# Patient Record
Sex: MALE | Race: WHITE
[De-identification: names, ages, dates, MRNs, and addresses within clinical notes are randomized per-mention and may not be internally consistent; named-entity substitution may affect disease eponyms.]

---

## 2017-12-18 ENCOUNTER — HOSPITAL ENCOUNTER (OUTPATIENT)
Dept: HOSPITAL 80 - FIMAGING | Age: 82
End: 2017-12-18
Attending: INTERNAL MEDICINE
Payer: COMMERCIAL

## 2017-12-18 DIAGNOSIS — R91.1: Primary | ICD-10-CM

## 2017-12-18 DIAGNOSIS — J84.9: ICD-10-CM

## 2017-12-18 DIAGNOSIS — J43.2: ICD-10-CM

## 2019-02-28 ENCOUNTER — HOSPITAL ENCOUNTER (EMERGENCY)
Dept: HOSPITAL 80 - FED | Age: 84
Discharge: HOME | End: 2019-02-28
Payer: COMMERCIAL

## 2019-02-28 VITALS — SYSTOLIC BLOOD PRESSURE: 133 MMHG | DIASTOLIC BLOOD PRESSURE: 74 MMHG
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
## 2019-02-28 DIAGNOSIS — Z23: ICD-10-CM

## 2019-02-28 DIAGNOSIS — S81.812A: Primary | ICD-10-CM

## 2019-02-28 NOTE — EDPHY
H & P


Stated Complaint: left skin lac after tripping


Time Seen by Provider: 02/28/19 15:35


HPI/ROS: 





CHIEF COMPLAINT:  Left pretibial laceration post mechanical fall





HISTORY OF PRESENT ILLNESS:  83-year-old male arrives via ambulance, not a 

trauma activation, complaining of acute left pretibial laceration.  He 

describes approximately 3 hr prior to arrival he was walking on stairs, 

sustained a mechanical fall and impacted his left pretibial region against the 

stair  He returned home and noted continued slow bleeding into his sock and 

shoe.  He is able to bear weight.  





Denies:  Head injury, so midline C-spine pain, peripheral paresthesia, weakness

, numbness, syncope, back pain, inability to bear weight, palpitations, 

prodrome.





PRIMARY CARE PROVIDER:  Dr. Abel Arce





REVIEW OF SYSTEMS:


10 systems reviewed and negative with the exception of the elements mentioned 

in the history of present illness








PAST MEDICAL/SURGICAL HISTORY: [no anticoagulant use beyond daily aspirin.





SOCIAL HISTORY: denies alcohol use at time of incident





*********





PHYSICAL EXAM 





1) GENERAL: Well-developed, well-nourished, alert and oriented.  Appears to be 

in no acute distress. Answering questions appropriately.  Smiling.  

Conversational.  Telling jokes.


2) HEAD: Normocephalic, atraumatic


3) HEENT: Pupils equal, round, reactive to light bilaterally. Negative Horners. 

Nasopharynx, oropharynx, clear.   No deformity or angulation of nose.  No 

septal hematoma.  No rhinorrhea. No oral trauma. Ears bilaterally with normal 

tympanic membranes.  No hemotympanum.  No fluid or blood in the external 

auditory canal.  No raccoon eyes.  No Steve sign..


4) NECK:  No cervical collar is on. Posterior cervical spine is nontender, no 

stepoff, no effusion. Full range of motion which does not elicit any midline 

cervical spine pain, no posterior midline tenderness, no step-off.


5) LUNGS: Clear to auscultation bilaterally, no wheezes, no rhonchi, no 

retractions.  No obvious signs of trauma.  No chest wall pain.  No flaring, no 

grunting.  Moving symmetrically.  No crepitus. 


6) HEART:  Regular rate and rhythm, 


7) ABDOMEN: No guarding, no rebound, no focal tenderness, no peritoneal signs, 

no signs of trauma, no ecchymosis


8) MUSCULOSKELETAL: Left pretibial skin avulsion.  No underlying osseous 

discomfort.  Soft compartments.  DP PT pulses present and brisk distally.  

Proximally distally nontender.  Moving all extremities, no focal areas of 

tenderness, no obvious trauma.


9) BACK:  No midline vertebral tenderness, no fluctuance, no step-off, no 

obvious trauma, no visual or palpable abnormality.


10) SKIN:   left pretibial skin avulsion, longitudinal measuring 1 cm in width 

and 6 cm in length.





***********





DIFFERENTIAL DIAGNOSIS:   In no particular order include but limited to avulsion

, abrasion, laceration, open fracture





- Personal History


Current Tetanus Diphtheria and Acellular Pertussis (TDAP): No


Tetanus Vaccine Date: 2007





- Medical/Surgical History


Hx Asthma: No


Hx Chronic Respiratory Disease: Yes


Hx Diabetes: No


Hx Cardiac Disease: No


Hx Renal Disease: No


Hx Cirrhosis: No


Hx Alcoholism: No


Hx HIV/AIDS: No


Hx Splenectomy or Spleen Trauma: No


Other PMH: interstitial lung disease





- Social History


Smoking Status: Former smoker


Constitutional: 


 Initial Vital Signs











Temperature (C)  36.5 C   02/28/19 15:29


 


Heart Rate  73   02/28/19 15:29


 


Respiratory Rate  18   02/28/19 15:29


 


Blood Pressure  123/82 H  02/28/19 15:29


 


O2 Sat (%)  92   02/28/19 15:29








 











O2 Delivery Mode               Nasal Cannula


 


O2 (L/minute)                  2














Allergies/Adverse Reactions: 


 





Penicillins Allergy (Verified 02/28/19 15:34)


 











Medical Decision Making





- Diagnostics


Imaging Results: 


 Imaging Impressions





Tibia/Fibula X-Ray  02/28/19 15:34


Impression: 


1. Soft tissue injury with no acute osseous findings.


2. Atherosclerosis.








Images reviewed myself


Procedures: 





Patient's pretibial laceration was anesthetized 1% lidocaine with epinephrine 

and copiously irrigated by myself and staff.  The patient has superficial skin 

avulsion that will not be amenable to closure.  There was a small area on the 

more proximal aspect laceration which was bleeding slowly with resultant 

hemostasis after surgery foam application.  The wound be allowed to heal via 

secondary intention.  Shows no signs of infection to this area.


ED Course/Re-evaluation: 





Re-evaluation with serial exams.  Wound will be allowed to heal via secondary 

intention.  Skin is superficial and thin and would not be amenable to closure 

with sutures.  No signs of infection at this time.  Given usual customary wound 

precautions instructions.  Patient feels comfortable being discharged.  Care of 

patient under supervision of secondary supervising physician Dr Bearden .





- Data Points


Medications Given: 


 








Discontinued Medications





Diphtheria/Tetanus/Acell Pertussis (Boostrix)  0.5 ml IM .ONCE ONE


   Stop: 02/28/19 15:36


   Last Admin: 02/28/19 15:55 Dose:  0.5 ml


Tranexamic Acid (Cyklokapron)  500 mg TP EDNOW ONE


   Stop: 02/28/19 15:52


   Last Admin: 02/28/19 15:58 Dose:  500 mg








Departure





- Departure


Disposition: Home, Routine, Self-Care


Clinical Impression: 


 Left pretibial skin avulsion





Condition: Good


Instructions:  Skin Avulsion (ED)


Additional Instructions: 


Return to the ER if you develop redness, swelling, discharge, warmth to the 

wound, red streaks going up your leg, or any other symptoms that concern you.


Referrals: 


Abel Arce MD [Medical Doctor] - 2-3 days, call for appt.

## 2019-03-17 ENCOUNTER — HOSPITAL ENCOUNTER (EMERGENCY)
Dept: HOSPITAL 80 - FED | Age: 84
Discharge: HOME | End: 2019-03-17
Payer: COMMERCIAL

## 2019-03-17 VITALS — DIASTOLIC BLOOD PRESSURE: 84 MMHG | SYSTOLIC BLOOD PRESSURE: 128 MMHG
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
## 2019-03-17 DIAGNOSIS — Z99.81: ICD-10-CM
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
## 2019-03-17 DIAGNOSIS — L03.116: ICD-10-CM

## 2019-03-17 DIAGNOSIS — M79.672: Primary | ICD-10-CM

## 2019-03-17 DIAGNOSIS — J98.4: ICD-10-CM

## 2019-03-17 NOTE — ASMTCAGE
CAGE

 

Do you feel you ought to      Answers:  Yes                                   

cut down on your drinking                                                     

or drug use?                                                                  

Do people annoy you by        Answers:  No                                    

criticizing your drinking                                                     

or drug use?                                                                  

Do you feel guilty about      Answers:  Yes                                   

your drinking or drug                                                         

use?                                                                          

Do you drink or use drugs     Answers:  No                                    

first thing in the                                                            

morning (Eye Opener)?                                                         

Additional Comments           See CM notes.

 

Date Signed:  03/17/2019 12:25 PM

Electronically Signed By:Montserrat Peoples RN

## 2019-03-17 NOTE — ASMTCMCOM
CM Note

 

CM Note                       

Notes:

Reviewed chart. Pt presented to the Emergency Department with c/o left foot pain s/p a fall two 

days ago. Pt reports tripping on his oxygen tubing. History includes interstitial lung disease with 


chronic oxygen use, eczema, former smoker. Pt is a  and lives in [place]. He is accompanied by 


a friend from [place]. Pt's dghtr Berenice is his emergency contact.



Met with pt and pt's dghtr Berenice to discuss potential needs. Pt was seen on 02/28/19 for a leg 

laceration s/p a fall. Pt reports that this was his third fall in recent months. The pt states he 

was recently put on continuous oxygen support and since that time he has been having trouble 

managing the oxygen tubing, especially at night. Discussed possibility of using a walker to assist 

pt with stability. Pt agreeable. Instructions provided on utilizing the walker with oxygen 

tubing. Pt verbalized understanding. Information provided on DME/loan closets for obtaining a 

walker. Pt's dghtr reports having a walker in her garage. [employer] information provided 

and discussed for safety. 



CAGE completed secondary to a positive SBIRT screening. Pt states he "drinks way too much" and he 

knows he "should stop." The pt said he started drinking after his wife passed away - he drinks at 

night to help him sleep. Resources offered. Encouraged pt to follow up with his PCP to discuss 

possible medications for sleep and/or depression. Education offered on counseling and grief/loss 

support groups. Pt states he used to attend a support group, but stopped going. Encouraged pt to 

reconsider trying a support group again. [facility] offered for additional 

resources. CM available for any further issues or concerns.

 

 

Date Signed:  03/17/2019 12:42 PM

Electronically Signed By:Montserrat Peoples RN

## 2019-03-17 NOTE — EDPHY
General





- History


Smoking Status: Former smoker


Time Seen by Provider: 03/17/19 10:12


Narrative: 





CLINICAL IMPRESSION:  Left foot pain, cellulitis


 


ASSESSMENT/PLAN: 





Patient is a 83-year-old man with a history of interstitial lung disease who 

presents to the emergency department with complaints of left foot pain. Patient 

is afebrile and not toxic appearing, no acute distress. Physical exam reveals 

generalized pain to the lateral aspect of the left foot with associated erythema

, calor and scabbing wounds.  Foot and ankle x-ray revealed no evidence of 

acute bony abnormality.  There was no evidence of fracture, dislocation or 

compartment syndrome.  No findings to suggest gout or septic arthritis.  

History of physical examination is consistent with left foot pain and 

cellulitis.  There was no evidence of systemic infection, abscess, necrotizing 

skin infection or deep space infection.  Patient has had increased number of 

falls recently, case management was consulted and visited with the patient and 

his daughter.  Had a long discussion with the patient's daughter about 

independent living and possible increased needs.  Patient was given Keflex and 

Bactrim in the emergency department and will continue for the next week.  He is 

well established with his PCP and will call to schedule an appointment for 

repeat exam for tomorrow, areas of erythema were marked for reference.  They 

have a walker at home and will use as needed, prior to discharge he was able to 

ambulate independently with a mild limp.  Conservative return precautions 

discussed- he will return for fever, redness, swelling, warmth, or streaking 

around the wound, new lesions, extremity swelling, pain out of proportion or 

for any other new, worsening or worrisome symptoms. Patient and daughter both 

verbalize understanding and are in agreement with plan.





DIFFERENTIAL DX: 


Differential diagnosis including but not limited to contusion, sprain, fracture

, dislocation


 


ED COURSE: 





1118:  Case and plan of care discussed with Dr. Troy





1122:  On repeat examination the patient is well-appearing, reviewed x-ray 

findings with him.  Awaiting arrival of daughter for further discussion as well 

as case management.  Discussed admission however patient refuses.





1215:  Patient's daughter at bedside, lives very close to father.  They have a 

walker at home which we will we will utilize.  They declined any need for 

further case management assistance, he is well established with his primary 

care provider and they will be seen tomorrow for repeat examination.





CHIEF COMPLAINT:  Left foot pain





HPI: 





Patient is an 83-year-old male with a history of interstitial lung disease 

chronically on oxygen therapy who presents to the emergency department with 

complaints of left foot pain after sustaining a fall 2 days prior.  Patient 

reports he was walking around his house when he accidentally tripped on his 

oxygen tubing causing him to injure his left foot.  He is unsure if he rolled 

it or hit it on something.  Patient noticed some redness to the outer portion 

of his foot yesterday, pain has persisted and ambulation is difficult.  He is 

ambulating with a limp.  Brought in by a friend from [place] as a saw him 

struggling to walk.  Patient did not hit his head, there was no loss of 

consciousness.  He denies any neck or back pain.  He denies any other injury or 

complaint.  He does have a history of underlying eczema with chronic wounds on 

his feet.  


_________________ 


PAST MEDICAL HISTORY:  Interstitial lung disease


Family History:  Noncontributory 


Social History:  Former smoker


_________________ 


ROS: 


A full 10 point review of systems was negative except for those mentioned in 

HPI. 


_________________ 


PHYSICAL EXAM: 


General Appearance: Alert, oriented, appropriate, cooperative, NAD, well 

hydrated, non-toxic appearing, VSS, no hypoxia. 


HEENT: TMs are clear bilaterally no perforation or FB, no injection, no 

evidence of serous or mucopurulent otitis. Oropharynx clear is no erythema or 

exudates, no tonsillar hypertrophy or asymmetry. Dentition without abnormality. 


Eyes: PERRLA, no acute vision change, nystagmus, swelling, discharge, pain or 

photosensitivity. Conjunctiva pink, no pallor or injection 


Neck: Supple, nontender, no lymphadenopathy, no midline pain, FROM, no 

meningismus. 


Respiratory: There are no retractions, lungs are clear to auscultation. 


Cardiac: Regular rate and rhythm, no murmurs or gallops. 


Gastrointestinal: Abdomen is soft, nontender, bowel sounds normal, no masses/

hernia, no rigidity, guarding or focal peritoneal findings. 


Skin: Warm, dry, no rashes, no nodules on palpation. 





Upper Extremities: Intact distal pulses, Full range of motion intact, no 

tenderness, no ecchymosis or edema


Lower Extremities:  Right lower extremity is unremarkable.  Intact distal pulses

, No edema, No tenderness, No cyanosis, full range of motion intact.


Left lower extremity reveals healing scab of the anterior shin consistent with 

laceration sustained recently.  Patient with multiple scabs superior to the 

left lateral malleolus, multiple scabs on the lateral aspect of the left foot.  

Patient with generalized erythema and calor of the lateral midfoot with 

associated tenderness to palpation.  He has no medial or lateral malleolar 

tenderness to palpation.





No calf tenderness bilaterally.


_________________ 


MEDICAL DECISION MAKING: 


Patient was seen independently. Secondary supervising physician at time of 

evaluation was Dr. Troy, he did not evaluate this patient. 


Diagnosis:  Left foot pain, cellulitis. New, requires workup 


Summary: See Assessment and Plan for summary of ED visit  


Clinical lab tests:  Not applicable. 


Independent visualization of images, tracing, or specimens:  Yes. 


Decision to obtain medical records or history from someone other than the 

patient:  No 


Review / Summarize previous medical records:  Yes 


Discussed patient with another provider:  Yes, Dr. Troy 


Patient Progress:  Stable, discharged.  (Margaux Lainez)


Medical Decision Making: 





I did not see this patient while he was in the emergency department.  However 

his care was discussed with the PA while the patient was in the department.  I 

agree with treatment plan and management (Miah Troy)





- Diagnostics


Imaging Results: 


 Imaging Impressions





Ankle X-Ray  03/17/19 10:01


Impression: There is no acute osseous abnormality identified.








Foot X-Ray  03/17/19 10:14


Impression: There is no acute osseous abnormality identified.














- Objective


Vital Signs: 


 Initial Vital Signs











Temperature (C)  36.5 C   03/17/19 09:57


 


Heart Rate  81   03/17/19 09:57


 


Respiratory Rate  20   03/17/19 09:57


 


Blood Pressure  120/79   03/17/19 09:57


 


O2 Sat (%)  84 L  03/17/19 09:57








 











O2 Delivery Mode               Nasal Cannula


 


O2 (L/minute)                  3














Allergies/Adverse Reactions: 


 





Penicillins Allergy (Verified 03/17/19 09:56)


 








Home Medications: 














 Medication  Instructions  Recorded


 


Aspirin  03/17/19


 


Cephalexin [Keflex (*)] 500 mg PO Q6H #28 cap 03/17/19


 


Preservision Softgel  03/17/19


 


Sulfamethox/Tmp 800/160 mg 1 tab PO BID #14 tab 03/17/19





[Bactrim Ds]  











Medications Given: 


 








Discontinued Medications





Acetaminophen (Tylenol)  650 mg PO EDNOW ONE


   Stop: 03/17/19 11:50


   Last Admin: 03/17/19 12:08 Dose:  650 mg


Cephalexin HCl (Keflex)  500 mg PO EDNOW ONE


   PRN Reason: Protocol


   Stop: 03/17/19 11:17


   Last Admin: 03/17/19 11:41 Dose:  500 mg


Trimethoprim/Sulfamethoxazole (Bactrim Ds)  1 ea PO EDNOW ONE


   PRN Reason: Protocol


   Stop: 03/17/19 11:18


   Last Admin: 03/17/19 11:42 Dose:  1 ea








Departure





- Departure


Disposition: Home, Routine, Self-Care


Clinical Impression: 


 Foot pain, left





Cellulitis


Qualifiers:


 Site of cellulitis: extremity Site of cellulitis of extremity: lower extremity 

Laterality: left Qualified Code(s): L03.116 - Cellulitis of left lower limb





Condition: Good


Instructions:  Cellulitis (ED)


Additional Instructions: 


DISCHARGE INSTRUCTIONS FROM YOUR DOCTOR 


Thank you for visiting our emergency department today. Please keep in mind that 

discharge from the emergency department does not mean that there is nothing 

wrong - it simply means that we have not identified an emergency condition that 

requires further evaluation or treatment in the hospital. You should always 

plan to follow up with primary care for re-evaluation of your condition in the 

next 1-2 days.  It is imperative that you follow up with your primary care 

provider tomorrow for a repeat examination.





If you have been referred to a specialist, please call as soon as possible (

today or tomorrow) to schedule your follow up appointment at the appropriate 

time; a podiatry referral has been provided. 





Rest, drink plenty of fluids, healthy foods, all to to help your immune system 

fight the infection and to help the healing process.





Elevate the affected limb as much as possible above the level of the heart.





Keflex (antibiotic) as prescribed four times daily, for the next 7 days.





Bactrim DS (antibiotic) twice daily as prescribed for the next 7 days.





Consume yogurt and take over the counter probiotics to help prevent diarrhea 

from the antibiotics. 





Tylenol 500 mg every 4-6 hours as directed for pain and/or fever. Do not exceed 

3000 mg in 24 hours.





Continue your regular medications as prescribed.





Schedule a follow-up appointment with your primary care physician in the next 24

-48 hours for a wound check to ensure you are healing and don't require further 

antibiotics or intervention. 





Return for persistent or recurrent fever, vomiting, inability to tolerate the 

antibiotic(s) by mouth, redness, swelling, warmth, or streaking around the wound

, increased redness outside the marked lines, new lesions, extremity swelling, 

pain out of proportion to what you would expect for this infection, chest or 

abdominal pain, vomiting, throat tightness, facial swelling, difficulty 

breathing or swallowing, sores in the mouth or the eyes, or for any other new, 

worsening or worrisome symptoms.





People present with illnesses and injuries in different ways, and it is always 

possible that we have missed something. You may always return for re-evaluation 

if symptoms worsen or if they are not improving or if you develop new/different 

symptoms. 





Again, thank you for choosing our emergency department. We hope that you feel 

better.


Referrals: 


Madison Molina DPM [Doctor of Podiatric Medicine] - 2-3 days, if not improved


Abel Arce MD [Primary Care Provider] - 1-2 days without fail


Prescriptions: 


Cephalexin [Keflex (*)] 500 mg PO Q6H #28 cap


Sulfamethox/Tmp 800/160 mg [Bactrim Ds] 1 tab PO BID #14 tab